# Patient Record
Sex: FEMALE | Race: WHITE
[De-identification: names, ages, dates, MRNs, and addresses within clinical notes are randomized per-mention and may not be internally consistent; named-entity substitution may affect disease eponyms.]

---

## 2020-07-21 ENCOUNTER — HOSPITAL ENCOUNTER (INPATIENT)
Dept: HOSPITAL 35 - ER | Age: 85
LOS: 3 days | Discharge: NURSING, REHAB OR LONG-TERM CARE | DRG: 871 | End: 2020-07-24
Attending: INTERNAL MEDICINE | Admitting: INTERNAL MEDICINE
Payer: COMMERCIAL

## 2020-07-21 VITALS — DIASTOLIC BLOOD PRESSURE: 65 MMHG | SYSTOLIC BLOOD PRESSURE: 122 MMHG

## 2020-07-21 VITALS — SYSTOLIC BLOOD PRESSURE: 122 MMHG | DIASTOLIC BLOOD PRESSURE: 65 MMHG

## 2020-07-21 VITALS — HEIGHT: 60 IN | BODY MASS INDEX: 21.2 KG/M2 | WEIGHT: 108 LBS

## 2020-07-21 DIAGNOSIS — E87.1: ICD-10-CM

## 2020-07-21 DIAGNOSIS — A09: ICD-10-CM

## 2020-07-21 DIAGNOSIS — A41.9: Primary | ICD-10-CM

## 2020-07-21 DIAGNOSIS — Z86.010: ICD-10-CM

## 2020-07-21 DIAGNOSIS — K55.1: ICD-10-CM

## 2020-07-21 DIAGNOSIS — Z90.710: ICD-10-CM

## 2020-07-21 DIAGNOSIS — Z79.899: ICD-10-CM

## 2020-07-21 DIAGNOSIS — J44.9: ICD-10-CM

## 2020-07-21 DIAGNOSIS — D63.8: ICD-10-CM

## 2020-07-21 DIAGNOSIS — Z90.49: ICD-10-CM

## 2020-07-21 DIAGNOSIS — Z92.3: ICD-10-CM

## 2020-07-21 DIAGNOSIS — Z88.8: ICD-10-CM

## 2020-07-21 DIAGNOSIS — E78.5: ICD-10-CM

## 2020-07-21 DIAGNOSIS — G92: ICD-10-CM

## 2020-07-21 DIAGNOSIS — J96.21: ICD-10-CM

## 2020-07-21 DIAGNOSIS — F03.90: ICD-10-CM

## 2020-07-21 DIAGNOSIS — Z85.118: ICD-10-CM

## 2020-07-21 DIAGNOSIS — Z88.1: ICD-10-CM

## 2020-07-21 DIAGNOSIS — N39.0: ICD-10-CM

## 2020-07-21 DIAGNOSIS — J45.909: ICD-10-CM

## 2020-07-21 DIAGNOSIS — Z03.818: ICD-10-CM

## 2020-07-21 DIAGNOSIS — Z79.82: ICD-10-CM

## 2020-07-21 LAB
ALBUMIN SERPL-MCNC: 2.9 G/DL (ref 3.4–5)
ALT SERPL-CCNC: 13 U/L (ref 30–65)
ANION GAP SERPL CALC-SCNC: 5 MMOL/L (ref 7–16)
ANISOCYTOSIS BLD QL SMEAR: (no result)
AST SERPL-CCNC: 18 U/L (ref 15–37)
BACTERIA-REFLEX: (no result) /HPF
BILIRUB SERPL-MCNC: 0.5 MG/DL (ref 0.2–1)
BILIRUB UR-MCNC: NEGATIVE MG/DL
BUN SERPL-MCNC: 16 MG/DL (ref 7–18)
CALCIUM SERPL-MCNC: 9 MG/DL (ref 8.5–10.1)
CHLORIDE SERPL-SCNC: 94 MMOL/L (ref 98–107)
CO2 SERPL-SCNC: 31 MMOL/L (ref 21–32)
COLOR UR: YELLOW
CREAT SERPL-MCNC: 0.9 MG/DL (ref 0.6–1)
ERYTHROCYTE [DISTWIDTH] IN BLOOD BY AUTOMATED COUNT: 16.2 % (ref 10.5–14.5)
GLUCOSE SERPL-MCNC: 128 MG/DL (ref 74–106)
GRANULOCYTES NFR BLD MANUAL: 83 % (ref 36–66)
HCT VFR BLD CALC: 27.8 % (ref 37–47)
HGB BLD-MCNC: 9.1 GM/DL (ref 12–15)
KETONES UR STRIP-MCNC: NEGATIVE MG/DL
LIPASE: 38 U/L (ref 73–393)
LYMPHOCYTES NFR BLD AUTO: 10 % (ref 24–44)
MCH RBC QN AUTO: 30.1 PG (ref 26–34)
MCHC RBC AUTO-ENTMCNC: 32.9 G/DL (ref 28–37)
MCV RBC: 91.6 FL (ref 80–100)
MONOCYTES NFR BLD: 4 % (ref 1–8)
MUCUS: (no result) STRN/LPF
NEUTROPHILS # BLD: 24.9 THOU/UL (ref 1.4–8.2)
NEUTS BAND NFR BLD: 3 % (ref 0–8)
PLATELET # BLD: 328 THOU/UL (ref 150–400)
POTASSIUM SERPL-SCNC: 4.3 MMOL/L (ref 3.5–5.1)
PROT SERPL-MCNC: 6.9 G/DL (ref 6.4–8.2)
RBC # BLD AUTO: 3.03 MIL/UL (ref 4.2–5)
RBC # UR STRIP: NEGATIVE /UL
RBC #/AREA URNS HPF: (no result) /HPF (ref 0–2)
SODIUM SERPL-SCNC: 130 MMOL/L (ref 136–145)
SP GR UR STRIP: 1.01 (ref 1–1.03)
SQUAMOUS: (no result) /LPF (ref 0–3)
TROPONIN I SERPL-MCNC: <0.06 NG/ML (ref ?–0.06)
URINE CLARITY: CLEAR
URINE GLUCOSE-RANDOM*: NEGATIVE
URINE LEUKOCYTES-REFLEX: (no result)
URINE NITRITE-REFLEX: NEGATIVE
URINE PROTEIN (DIPSTICK): NEGATIVE
URINE WBC-REFLEX: (no result) /HPF (ref 0–5)
UROBILINOGEN UR STRIP-ACNC: 0.2 E.U./DL (ref 0.2–1)
WBC # BLD AUTO: 28.9 THOU/UL (ref 4–11)

## 2020-07-21 PROCEDURE — 10080 I&D PILONIDAL CYST SIMPLE: CPT

## 2020-07-21 NOTE — EMS
[address]   [address]                     EMS Patient Care Report       
_______________________________________________________________________________
 
Name:       HUMAIRA GILLESPIE THERESE           Room #:         357-P       ADM IN  
M.R.#:      [id number]                       Account #:      [id number]  
Admission:  20    Attend Phys:    Rony Corcoran MD      
Discharge:              Date of Birth:  [birth year]  
                                                          Report #: [id number]
                                                                    [id number]
_______________________________________________________________________________
THIS REPORT FOR:   //name//                          
 
Report Transmitted: 2020 04:46
EMS Care Summary
[facility] MED-ACT
Incident [id number] @ 2020 18:39
 
Incident Location
[address] [place] [address]
 
Patient
HUMAIRA GILLESPIE
Female, 87 Years
 1933
 
Patient Address
[address] [place] [address]
 
Patient History
Chronic Obstructive Pulmonary Disease (COPD),Hypertension 
(HTN),Hyperlipidemia,Lung Cancer,Anemia, 
 
Patient Allergies
Penicillin allergy,Sulfa,Other drug allergy,Amoxicillin,
 
Patient Medications
Oxygen, Ferrous Sulfate, Melatonin, Amlodipine, Symbicort, Potassium, Lexapro, 
Atorvastatin, Vitamin B12, Nitroglycerin, Oscal 500, Lidocaine, 
 
Chief Complaint
N/V/D W/FEVER PER STAFF
 
Disposition
Transported No Lights/[place]
 
Dispatch Reason
Sick Person
 
Transported To
[facility]
 
Narrative
 IS DISPATCHED AND RESPONDS AS NOTED.
 
 
 
 
[facility]
[address] [place]   [address]                     EMS Patient Care Report       
_______________________________________________________________________________
 
Name:       HUMAIRA GILLESPIE           Room #:         357-P       ADM IN  
KIZZY#:      [id number]                       Account #:      [id number]  
Admission:  20    Attend Phys:    Rony Corcoran MD      
Discharge:              Date of Birth:  [birth year]  
                                                          Report #: [id number]
                                                                    [id number]
_______________________________________________________________________________
UPON ARRIVAL AT SCENE  MAKES CONTACT WITH [facility] WHO STATES STATE ADVISED PT 
HAS N/V/D AND SLIGHT FEVER AND REQUEST HER BE TAKEN TO [facility]. 
 
UPON PT CONTACT, PT IS CONSCIOUS, TRACKING, SHOWING NO SIGNS OF DISTRESS OR 
OBVIOUS TRAUMA.  PT DENIES ALL COMPLAINTS. 
 
PT MOVED TO COT, SECURED WITH ALL STRAPS, MOVED TO AMBULANCE WITHOUT INCIDENT.
 
PT MONITORED ENROUTE. RADIO REPORT CALLED ENROUTE.
 
UPON ARRIVAL AT [facility], PT MOVED TO ER ROOM 7, PLACED IN BED WITH RAILS 
UP. PT CARE TRANSFERRED TO ER RN WITH VERBAL REPORT. 
 
Initial Vitals
@PTAP: 110,R: 18,BP: 105/58,Pain: 0/10,GCS: 14,SpO2: 96,Revised Trauma: 12,
@19:05P: 106,R: 18,BP: 118/67,Pain: 0/10,GCS: 14,SpO2: 97,Revised Trauma: 12,
@18:54P: 99,R: 18,BP: 120/72,Pain: 0/10,GCS: 14,Temp: 99F,SpO2: 99,Revised 
Trauma: 12, 
 
Assessments
@18:49MENTAL:Confused,Person Oriented,SKIN:HEENT:Eyes: Left Pupil: 4-mm,Eyes: 
Right Pupil: 4-mm,LUNG SOUNDS:ABDOMEN:PELVIS//GI:EXTREMITIES:Capillary 
Refill: Right Upper: < 2 Sec,Capillary Refill: Left Upper: < 2 
Sec,PULSE:Radial: 2+ Normal,NEURO: 
 
Impression
Vomiting
 
Procedures
@PTAOxygen FlowRate: 3 Device: Nasal Cannula (NC) Response: 
UnchangedSucceeded@18:49ALS AssessmentResponse: UnchangedSucceeded 
 
Timeline
PTA,Oxygen FlowRate: 3 Device: Nasal Cannula (NC) Response: UnchangedSucceeded,
PTA,BP: 105/58 M,PULSE: 110,RR: 18 R,SPO2: 96 Ox,ETCO2:  ,BG: ,PAIN: 0,GCS: 14,
18:12,Call Received
18:12,Psap Call
18:39,Dispatched
18:41,En Route
18:47,On Scene
18:49,At Patient
18:49,ALS Assessment,Response: UnchangedSucceeded,
18:53,Depart Scene
18:54,BP: 120/72 M,PULSE: 99,RR: 18 R,SPO2: 99 Ox,ETCO2:  ,BG: ,PAIN: 0,GCS: 14,
19:05,BP: 118/67 M,PULSE: 106,RR: 18 R,SPO2: 97 Ox,ETCO2:  ,BG: ,PAIN: 0,GCS: 
14, 
 
 
 
[address]   [address]                     EMS Patient Care Report       
_______________________________________________________________________________
 
Name:       HUMAIRA GILLESPIE THERESE           Room #:         357-P       ADM IN  
M.R.#:      [id number]                       Account #:      [id number]  
Admission:  20    Attend Phys:    Rony Corcoran MD      
Discharge:              Date of Birth:  [birth year]  
                                                          Report #: [id number]
                                                                    [id number]
_______________________________________________________________________________
19:08,At Destination
19:12,Transfer Patient
19:20,Call Closed
 
Disclaimer
v1.1     Copyright 2020 [email or web address]
This EMS Care Summary contains data elements from the applicable legal record 
(which may be displayed differently). It is designed to provide pertinent 
information for the following purposes: continuity of care, clinical quality, 
and state data reporting. The complete legal record is available to ED staff 
and administrators of the receiving hospital in [employer]'s Patient Tracker. All data 
is provided "as is."

## 2020-07-21 NOTE — NUR
UPDATED SON ON PATIENT CONDITION.  SON NEEDS TO BE NOTIFIED OF PT COVID TEST
RESULTS BECAUSE HIS WIFE WAS SENT HOME FROM WORK D/T PT COMING TO THE HOSPITAL.
 PT DAUGHTER-IN-LAW WILL NOT BE ABLE TO RETURN TO WORK UNTIL THE PT COVID TEST
COMES BACK NEGATIVE

## 2020-07-22 VITALS — SYSTOLIC BLOOD PRESSURE: 119 MMHG | DIASTOLIC BLOOD PRESSURE: 54 MMHG

## 2020-07-22 VITALS — DIASTOLIC BLOOD PRESSURE: 50 MMHG | SYSTOLIC BLOOD PRESSURE: 102 MMHG

## 2020-07-22 VITALS — DIASTOLIC BLOOD PRESSURE: 56 MMHG | SYSTOLIC BLOOD PRESSURE: 110 MMHG

## 2020-07-22 LAB
ANION GAP SERPL CALC-SCNC: 3 MMOL/L (ref 7–16)
BUN SERPL-MCNC: 11 MG/DL (ref 7–18)
CALCIUM SERPL-MCNC: 8 MG/DL (ref 8.5–10.1)
CHLORIDE SERPL-SCNC: 98 MMOL/L (ref 98–107)
CO2 SERPL-SCNC: 29 MMOL/L (ref 21–32)
CREAT SERPL-MCNC: 0.7 MG/DL (ref 0.6–1)
ERYTHROCYTE [DISTWIDTH] IN BLOOD BY AUTOMATED COUNT: 16.2 % (ref 10.5–14.5)
GLUCOSE SERPL-MCNC: 97 MG/DL (ref 74–106)
HCT VFR BLD CALC: 22.3 % (ref 37–47)
HGB BLD-MCNC: 7.3 GM/DL (ref 12–15)
MAGNESIUM SERPL-MCNC: 1.9 MG/DL (ref 1.8–2.4)
MCH RBC QN AUTO: 30.3 PG (ref 26–34)
MCHC RBC AUTO-ENTMCNC: 32.8 G/DL (ref 28–37)
MCV RBC: 92.4 FL (ref 80–100)
PLATELET # BLD: 282 THOU/UL (ref 150–400)
POTASSIUM SERPL-SCNC: 3.6 MMOL/L (ref 3.5–5.1)
RBC # BLD AUTO: 2.42 MIL/UL (ref 4.2–5)
SODIUM SERPL-SCNC: 130 MMOL/L (ref 136–145)
WBC # BLD AUTO: 20 THOU/UL (ref 4–11)

## 2020-07-22 NOTE — NUR
RECIEVED FROM ED VIA GURNEY , ALERT TO SELF AND SITUATION , BUT UNABLE TO
RECALL INFORMATION FOR DATA BASE, ASSESSMENT COMPLETED, SON CALLED TO
COMPLETE ADMISSION PROCESS. IV FLUIDS STARTED , LASSITER WITH CLEAR YELLOW URINE.
BED ALARM ON FOR SAFETY. DISCUSSED PLASN OF CARE SON AND PT BOTH VERBALIZED
UNDERSTANDING.

## 2020-07-22 NOTE — NUR
PATIENT NOW SLEEPING AND REPIRATIONS ARE NON LABORED. PLEASANT WITH CARES.
DOES NOT SEEM TO BE IN PAIN. SHE STATED SHE WANTS TO GO HOME. FIRST COVID
NEGATIVE BUT WILL WAITH FOR DR RODRIGUEZ TO SEE. RECORDS FROM [facility] IN CHART.
JESSICA CONT WITH PLAN OF CARE.

## 2020-07-22 NOTE — EKG
[facility]
[address] [place]   [address]                     ELECTROCARDIOGRAM REPORT      
_______________________________________________________________________________
 
Name:       HUMAIRA GILLESPIE THERESE           Room #:         357-P       ADM IN  
M.R.#:      [id number]                       Account #:      [id number]  
Admission:  20    Attend Phys:    Rony Corcoran MD      
Discharge:              Date of Birth:  [birth year]  
                                                          Report #: [id number]
                                                                    [id number]
_______________________________________________________________________________
THIS REPORT FOR:  
 
cc:  Russ Grace MD, Harry MD Lundgren,Cain MONTENEGRO MD formerly [facility]                                        ~
THIS REPORT FOR:   //name//                          
 
                         [facility] ED
                                       
Test Date:    2020               Test Time:    19:34:07
Pat Name:     HUMAIRA GILLESPIE           Department:   
Patient ID:   [id number]            Room:         357
Gender:       F                        Technician:   DIONICIO
:          1933               Requested By: Nathanael Pradhan
Order Number: [id number] MD:   Cain Saravia
                                 Measurements
Intervals                              Axis          
Rate:         87                       P:            -83
IL:           118                      QRS:          -25
QRSD:         84                       T:            15
QT:           387                                    
QTc:          466                                    
                           Interpretive Statements
Sinus or ectopic atrial rhythm
Inferior infarct, old
Compared to ECG 2014 13:34:39
Ectopic atrial rhythm now present
Electronically Signed On 2020 7:43:50 CDT by Cain Saravia
[email or web address]
 
 
 
 
 
 
 
 
 
 
 
 
 
 
 
  <ELECTRONICALLY SIGNED>
   By: Cain Saravia MD, formerly [facility]   
  20     0743
D: 20                           _____________________________________
T: 20                           Cain Saravia MD, formerly [facility]     /EPI

## 2020-07-22 NOTE — NUR
INITIAL ASSESSMENT:
Received consult. PAUL reviewed chart and spoke with nursing. Pt was admitted
from [facility] due to colitis. Pt placed in Enhanced Isolation to
r/o COVID-19. Test is pending at this time. Pt is on IV steroids.  Pt with hx
of dementia. Per chart, pt is normally on O2 at the facility. PAUL left voice
message for pt's son aGlileo, ([phone]) to obtain info for assessment. PAUL
called [facility] and left message for pt's nurse at the facility to
ask about pt's prior level of functioning. Awaiting calls back at this time.
PAUL is following to assist as needed with discharge planning.

## 2020-07-23 VITALS — SYSTOLIC BLOOD PRESSURE: 120 MMHG | DIASTOLIC BLOOD PRESSURE: 51 MMHG

## 2020-07-23 VITALS — SYSTOLIC BLOOD PRESSURE: 106 MMHG | DIASTOLIC BLOOD PRESSURE: 59 MMHG

## 2020-07-23 VITALS — SYSTOLIC BLOOD PRESSURE: 158 MMHG | DIASTOLIC BLOOD PRESSURE: 64 MMHG

## 2020-07-23 VITALS — SYSTOLIC BLOOD PRESSURE: 128 MMHG | DIASTOLIC BLOOD PRESSURE: 57 MMHG

## 2020-07-23 LAB
ANION GAP SERPL CALC-SCNC: 4 MMOL/L (ref 7–16)
BUN SERPL-MCNC: 15 MG/DL (ref 7–18)
CALCIUM SERPL-MCNC: 7.9 MG/DL (ref 8.5–10.1)
CHLORIDE SERPL-SCNC: 99 MMOL/L (ref 98–107)
CO2 SERPL-SCNC: 29 MMOL/L (ref 21–32)
CREAT SERPL-MCNC: 0.8 MG/DL (ref 0.6–1)
ERYTHROCYTE [DISTWIDTH] IN BLOOD BY AUTOMATED COUNT: 16.1 % (ref 10.5–14.5)
GLUCOSE SERPL-MCNC: 105 MG/DL (ref 74–106)
HCT VFR BLD CALC: 22.1 % (ref 37–47)
HGB BLD-MCNC: 7.3 GM/DL (ref 12–15)
IRON SERPL-MCNC: 45 UG/DL (ref 50–170)
MAGNESIUM SERPL-MCNC: 1.9 MG/DL (ref 1.8–2.4)
MCH RBC QN AUTO: 30.5 PG (ref 26–34)
MCHC RBC AUTO-ENTMCNC: 32.9 G/DL (ref 28–37)
MCV RBC: 92.5 FL (ref 80–100)
PLATELET # BLD: 246 THOU/UL (ref 150–400)
POTASSIUM SERPL-SCNC: 3.5 MMOL/L (ref 3.5–5.1)
RBC # BLD AUTO: 2.39 MIL/UL (ref 4.2–5)
SAO2 % BLD FROM PO2: 30 % (ref 20–39)
SODIUM SERPL-SCNC: 132 MMOL/L (ref 136–145)
TIBC SERPL-MCNC: 148 UG/DL (ref 250–450)
WBC # BLD AUTO: 9 THOU/UL (ref 4–11)

## 2020-07-23 NOTE — NUR
PATIENT HAS RESTED IN ROOM THROUGH THE DAY. SECOND COVID WAS NEGATIVE. PER
ID NURSE PATIENT CAN MOVE OUT OF ISOLATION. LASSITER INTACT, IV INTACT. WILL CON
[facility] PLAN OF CARE.

## 2020-07-23 NOTE — NUR
PAUL reviewed chart and spoke with nursing and attending physician. Pt remains
in Enhanced Isolation to r/o COVID-19. Pt's first test was negative. Repeat
test is pending. Pt also being tested for c.diff.  SW received voice message
from pt's son, Galileo. SW returned call and left voice message. PAUL spoke with
Rachna at [facility] to provide update and determine pt's prior level of
functioning. Pt was able to ambulate with a rollator walker and do her ADLs
with cueing. Pt is normally on 3L of continuous O2. PT/OT ordered to evaluate
pt when she is able to participate with therapy.  SW to fax clinical info to
[facility] when second COVID results are available. PAUL is following to assist
as needed with disharge planning.

## 2020-07-24 VITALS — DIASTOLIC BLOOD PRESSURE: 58 MMHG | SYSTOLIC BLOOD PRESSURE: 144 MMHG

## 2020-07-24 VITALS — SYSTOLIC BLOOD PRESSURE: 137 MMHG | DIASTOLIC BLOOD PRESSURE: 61 MMHG

## 2020-07-24 LAB
ERYTHROCYTE [DISTWIDTH] IN BLOOD BY AUTOMATED COUNT: 16.8 % (ref 10.5–14.5)
HCT VFR BLD CALC: 25 % (ref 37–47)
HGB BLD-MCNC: 8.1 GM/DL (ref 12–15)
MCH RBC QN AUTO: 30 PG (ref 26–34)
MCHC RBC AUTO-ENTMCNC: 32.4 G/DL (ref 28–37)
MCV RBC: 92.6 FL (ref 80–100)
PLATELET # BLD: 269 THOU/UL (ref 150–400)
RBC # BLD AUTO: 2.71 MIL/UL (ref 4.2–5)
WBC # BLD AUTO: 12.4 THOU/UL (ref 4–11)

## 2020-07-24 NOTE — NUR
Pt. slept some. Maintaining O2 sat in the upper 90's on 2.5L/NC. No
respiratory distress. Afebrile. Bed alarm on and SCD's on. Son called to get
an update on pt. last night. Pt. making progress towards care plan goals.

## 2020-07-24 NOTE — NUR
PAUL reviewed chart and spoke with nursing and attending physician. Pt has had
two negative COVID tests. Order for Enhanced Isolation precautions to be
discontinued. Therapy ordered to evaluate pt. PT evaluated pt earlier today
and states pt is SBA. Pt able to return to her AL. PAUL spoke with CHEIKH Morales at [facility], to notify of pt's possible discharge back today.
Carmen states that pt can receive therapy at there facility if they have an
order. PAUL faxed additional clinical info for review. The facility is not able
to provide transportation. SW to arrange w/c van with O2 when discharged. SW
is following to assist as needed with discharge planning.

## 2020-07-24 NOTE — NUR
DISCHARGE NOTE:
Discharge orders finalized.  PAUL arranged w/c van transportation through
[employer] for [phone]. Director of Case Mgmt
authorized.  PAUL faxed discharge orders/summary to [facility] and
spoke with CHEIKH Morales, to notify of discharge.  Carmen confirmed they are able
to accept pt back today. PAUL spoke with pt's son, Galileo, via phone to provide
update and notify of discharge. Galileo is aware and in agreement with plan.
Chart copy requested. No additional SW needs identified at this time, but is
available to assist should needs arise.